# Patient Record
Sex: MALE | URBAN - METROPOLITAN AREA
[De-identification: names, ages, dates, MRNs, and addresses within clinical notes are randomized per-mention and may not be internally consistent; named-entity substitution may affect disease eponyms.]

---

## 2023-08-09 ENCOUNTER — OFFICE VISIT (OUTPATIENT)
Dept: URBAN - METROPOLITAN AREA CLINIC 96 | Facility: CLINIC | Age: 23
End: 2023-08-09

## 2023-08-11 ENCOUNTER — DASHBOARD ENCOUNTERS (OUTPATIENT)
Age: 23
End: 2023-08-11

## 2023-08-11 ENCOUNTER — CLAIMS CREATED FROM THE CLAIM WINDOW (OUTPATIENT)
Dept: URBAN - METROPOLITAN AREA CLINIC 27 | Facility: CLINIC | Age: 23
End: 2023-08-11
Payer: COMMERCIAL

## 2023-08-11 ENCOUNTER — LAB OUTSIDE AN ENCOUNTER (OUTPATIENT)
Dept: URBAN - METROPOLITAN AREA CLINIC 27 | Facility: CLINIC | Age: 23
End: 2023-08-11

## 2023-08-11 ENCOUNTER — TELEPHONE ENCOUNTER (OUTPATIENT)
Dept: URBAN - METROPOLITAN AREA CLINIC 27 | Facility: CLINIC | Age: 23
End: 2023-08-11

## 2023-08-11 VITALS
HEART RATE: 66 BPM | RESPIRATION RATE: 17 BRPM | WEIGHT: 250 LBS | SYSTOLIC BLOOD PRESSURE: 119 MMHG | HEIGHT: 72 IN | DIASTOLIC BLOOD PRESSURE: 70 MMHG | BODY MASS INDEX: 33.86 KG/M2

## 2023-08-11 DIAGNOSIS — R10.9 RIGHT SIDED ABDOMINAL PAIN: ICD-10-CM

## 2023-08-11 DIAGNOSIS — R19.7 ACUTE DIARRHEA: ICD-10-CM

## 2023-08-11 DIAGNOSIS — K62.5 RECTAL BLEEDING: ICD-10-CM

## 2023-08-11 PROCEDURE — 99204 OFFICE O/P NEW MOD 45 MIN: CPT | Performed by: INTERNAL MEDICINE

## 2023-08-11 RX ORDER — SOD SULF/POT CHLORIDE/MAG SULF 1.479 G
12 TABLETS THE FIRST DOSE THE EVENING BEFORE AND SECOND DOSE THE MORNING OF COLONOSCOPY TABLET ORAL TWICE A DAY
Qty: 24 | Refills: 0 | OUTPATIENT
Start: 2023-08-11 | End: 2023-08-12

## 2023-08-11 NOTE — HPI-TODAY'S VISIT:
Mr. Elise is a 23-year-old self-referred male who presents today with intermittent right sided abdominal discomfort x 8 months. He describes it tension or a knot/ball on his right lateral side and RLQ. States something "feels off". The sensation is more noticeable when laying in bed at night. Not affected by eating, defecation or movement. It does not disturb his sleep. He reports diarrhea x 8 months. He has 4 loose/watery nonbloody stools per day . There is a lot of mucus present. He does not have nocturnal stools. He has seen streaks of red blood on the fecal matter after frequent stooling.

## 2023-08-14 LAB
A/G RATIO: 1.8
ABSOLUTE BASOPHILS: 47
ABSOLUTE EOSINOPHILS: 159
ABSOLUTE LYMPHOCYTES: 2561
ABSOLUTE MONOCYTES: 301
ABSOLUTE NEUTROPHILS: 2832
ALBUMIN: 4.6
ALKALINE PHOSPHATASE: 76
ALT (SGPT): 25
AST (SGOT): 18
BASOPHILS: 0.8
BILIRUBIN, TOTAL: 0.6
BUN/CREATININE RATIO: (no result)
BUN: 20
C-REACTIVE PROTEIN, QUANT: 3.5
CALCIUM: 9.5
CARBON DIOXIDE, TOTAL: 24
CHLORIDE: 106
CREATININE: 1.08
EGFR: 98
EOSINOPHILS: 2.7
GLOBULIN, TOTAL: 2.6
GLUCOSE: 80
HEMATOCRIT: 38.3
HEMOGLOBIN: 13
LYMPHOCYTES: 43.4
MCH: 28.4
MCHC: 33.9
MCV: 83.8
MONOCYTES: 5.1
MPV: 10.2
NEUTROPHILS: 48
PLATELET COUNT: 269
POTASSIUM: 4.1
PROTEIN, TOTAL: 7.2
RDW: 13.4
RED BLOOD CELL COUNT: 4.57
SODIUM: 140
TSH: 1.06
WHITE BLOOD CELL COUNT: 5.9

## 2023-08-16 ENCOUNTER — TELEPHONE ENCOUNTER (OUTPATIENT)
Dept: URBAN - METROPOLITAN AREA CLINIC 27 | Facility: CLINIC | Age: 23
End: 2023-08-16

## 2023-08-31 ENCOUNTER — OFFICE VISIT (OUTPATIENT)
Dept: URBAN - METROPOLITAN AREA CLINIC 27 | Facility: CLINIC | Age: 23
End: 2023-08-31
Payer: COMMERCIAL

## 2023-08-31 VITALS
WEIGHT: 287 LBS | HEIGHT: 72 IN | DIASTOLIC BLOOD PRESSURE: 87 MMHG | SYSTOLIC BLOOD PRESSURE: 136 MMHG | HEART RATE: 70 BPM | BODY MASS INDEX: 38.87 KG/M2 | RESPIRATION RATE: 17 BRPM

## 2023-08-31 DIAGNOSIS — R10.11 ABDOMINAL PAIN, RUQ: ICD-10-CM

## 2023-08-31 DIAGNOSIS — R19.7 ACUTE DIARRHEA: ICD-10-CM

## 2023-08-31 PROCEDURE — 99213 OFFICE O/P EST LOW 20 MIN: CPT | Performed by: INTERNAL MEDICINE

## 2023-08-31 NOTE — HPI-TODAY'S VISIT:
Mr. Elise is a 23-year-old self-referred male who presents today for a follow-up visit. He was previously seen in consultation for intermittent right sided abdominal discomfort x 8 months. He described it tension or a knot/ball on his right lateral side and RLQ. Overall, the pain has subsided. He believes the feeling is associated with eating  meat, pizza or chips. He changed his diet and the pain has improved significantly. He was having 4 loose/watery nonbloody stools per day. Now  back to his baseline of 1-3 formed stools per day. The mucus per rectum has resolved. He has not seen in blood in his stools since the last OV. Labs were normal aside for borderline low normal Hgb 13.0. CRP & TSH were WNL. He was previously advised to have a colonoscopy. He presents today to discuss deferring the colonoscopy since he is dramatically better.

## 2023-09-08 ENCOUNTER — OFFICE VISIT (OUTPATIENT)
Dept: URBAN - METROPOLITAN AREA SURGERY CENTER 7 | Facility: SURGERY CENTER | Age: 23
End: 2023-09-08

## 2023-09-22 ENCOUNTER — OFFICE VISIT (OUTPATIENT)
Dept: URBAN - METROPOLITAN AREA CLINIC 96 | Facility: CLINIC | Age: 23
End: 2023-09-22

## 2023-11-30 ENCOUNTER — OFFICE VISIT (OUTPATIENT)
Dept: URBAN - METROPOLITAN AREA CLINIC 27 | Facility: CLINIC | Age: 23
End: 2023-11-30